# Patient Record
Sex: FEMALE | Race: WHITE | URBAN - METROPOLITAN AREA
[De-identification: names, ages, dates, MRNs, and addresses within clinical notes are randomized per-mention and may not be internally consistent; named-entity substitution may affect disease eponyms.]

---

## 2022-10-25 ENCOUNTER — OFFICE VISIT (OUTPATIENT)
Dept: URBAN - METROPOLITAN AREA CLINIC 18 | Facility: CLINIC | Age: 87
End: 2022-10-25
Payer: MEDICARE

## 2022-10-25 DIAGNOSIS — D48.5 NEOPLASM OF UNCERTIAN BEHAVIOR OF SKIN: Primary | ICD-10-CM

## 2022-10-25 PROCEDURE — 99202 OFFICE O/P NEW SF 15 MIN: CPT | Performed by: OPTOMETRIST

## 2022-10-25 RX ORDER — LOSARTAN POTASSIUM 25 MG/1
25 MG TABLET, FILM COATED ORAL
Qty: 0 | Refills: 0 | Status: ACTIVE
Start: 2022-10-25

## 2022-10-25 ASSESSMENT — INTRAOCULAR PRESSURE
OS: 9
OD: 9

## 2022-10-25 NOTE — IMPRESSION/PLAN
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 Plan: Benign bump without inflammation, infection, fluid, or elevation. Instructed patient to watch for changes and use Vaseline or hydrocortisone if inflamed.